# Patient Record
Sex: MALE | Race: WHITE | Employment: PART TIME | URBAN - METROPOLITAN AREA
[De-identification: names, ages, dates, MRNs, and addresses within clinical notes are randomized per-mention and may not be internally consistent; named-entity substitution may affect disease eponyms.]

---

## 2021-06-10 ENCOUNTER — VIRTUAL VISIT (OUTPATIENT)
Dept: PRIMARY CARE CLINIC | Age: 26
End: 2021-06-10
Payer: MEDICARE

## 2021-06-10 DIAGNOSIS — J45.998 ASTHMA, PERSISTENT NOT CONTROLLED: Primary | ICD-10-CM

## 2021-06-10 DIAGNOSIS — F90.2 ATTENTION DEFICIT HYPERACTIVITY DISORDER (ADHD), COMBINED TYPE: ICD-10-CM

## 2021-06-10 DIAGNOSIS — L40.9 PSORIASIS: ICD-10-CM

## 2021-06-10 DIAGNOSIS — F41.9 ANXIETY AND DEPRESSION: ICD-10-CM

## 2021-06-10 DIAGNOSIS — F32.A ANXIETY AND DEPRESSION: ICD-10-CM

## 2021-06-10 PROBLEM — F90.9 ADHD: Status: ACTIVE | Noted: 2021-06-10

## 2021-06-10 PROCEDURE — G8421 BMI NOT CALCULATED: HCPCS | Performed by: STUDENT IN AN ORGANIZED HEALTH CARE EDUCATION/TRAINING PROGRAM

## 2021-06-10 PROCEDURE — G8427 DOCREV CUR MEDS BY ELIG CLIN: HCPCS | Performed by: STUDENT IN AN ORGANIZED HEALTH CARE EDUCATION/TRAINING PROGRAM

## 2021-06-10 PROCEDURE — 1036F TOBACCO NON-USER: CPT | Performed by: STUDENT IN AN ORGANIZED HEALTH CARE EDUCATION/TRAINING PROGRAM

## 2021-06-10 PROCEDURE — 99204 OFFICE O/P NEW MOD 45 MIN: CPT | Performed by: STUDENT IN AN ORGANIZED HEALTH CARE EDUCATION/TRAINING PROGRAM

## 2021-06-10 RX ORDER — CALCIPOTRIENE AND BETAMETHASONE DIPROPIONATE 50; .5 UG/G; MG/G
AEROSOL, FOAM TOPICAL
Qty: 60 G | Refills: 1 | Status: SHIPPED | OUTPATIENT
Start: 2021-06-10

## 2021-06-10 RX ORDER — BUDESONIDE AND FORMOTEROL FUMARATE DIHYDRATE 160; 4.5 UG/1; UG/1
2 AEROSOL RESPIRATORY (INHALATION) 2 TIMES DAILY
Qty: 1 INHALER | Refills: 0 | Status: SHIPPED | OUTPATIENT
Start: 2021-06-10 | End: 2021-07-06

## 2021-06-10 SDOH — ECONOMIC STABILITY: FOOD INSECURITY: WITHIN THE PAST 12 MONTHS, YOU WORRIED THAT YOUR FOOD WOULD RUN OUT BEFORE YOU GOT MONEY TO BUY MORE.: NEVER TRUE

## 2021-06-10 SDOH — ECONOMIC STABILITY: FOOD INSECURITY: WITHIN THE PAST 12 MONTHS, THE FOOD YOU BOUGHT JUST DIDN'T LAST AND YOU DIDN'T HAVE MONEY TO GET MORE.: NEVER TRUE

## 2021-06-10 ASSESSMENT — PATIENT HEALTH QUESTIONNAIRE - PHQ9
1. LITTLE INTEREST OR PLEASURE IN DOING THINGS: 0
SUM OF ALL RESPONSES TO PHQ QUESTIONS 1-9: 0
SUM OF ALL RESPONSES TO PHQ9 QUESTIONS 1 & 2: 0
2. FEELING DOWN, DEPRESSED OR HOPELESS: 0
SUM OF ALL RESPONSES TO PHQ QUESTIONS 1-9: 0
SUM OF ALL RESPONSES TO PHQ QUESTIONS 1-9: 0

## 2021-06-10 ASSESSMENT — ENCOUNTER SYMPTOMS
DIARRHEA: 0
NAUSEA: 0
COUGH: 0
SORE THROAT: 0
SHORTNESS OF BREATH: 0
VOMITING: 0
CONSTIPATION: 0
RHINORRHEA: 0

## 2021-06-10 ASSESSMENT — SOCIAL DETERMINANTS OF HEALTH (SDOH): HOW HARD IS IT FOR YOU TO PAY FOR THE VERY BASICS LIKE FOOD, HOUSING, MEDICAL CARE, AND HEATING?: NOT HARD AT ALL

## 2021-06-10 NOTE — PATIENT INSTRUCTIONS
Here are some places you can call for therapy    o [facility] and [address] and [place] [facility]  ii. [address] [facility], [address] [place] [address], [place] [address]  iii. [email or web address]  v. [address] Accepts [place]  ii. 271 [facility], [address] [place] [address], Yakov Martinez  iii. [phone] ext. [phone] [facility]  i. Accepts [facility]  ii. [facility], [facility]  iii. [phone]    o Sharri Jones  i. Accepts multiple Medicaid plans  ii. [address], [address], [place], Luige [address]  iii. [phone]  iv. [email or web address]    o Lala Katzs  i. Accepts multiple Medicaid plans  ii. [address], 9[facility], [place], [address] [place] [address]  iii. [phone]    o Vinny Suresh Psy.D.  i. Accepts multiple Medicaid plans  ii. [facility], [place], Luige [address]  iii. 77 Rupam Rohan Maravilla Accepts multiple Medicaid plans  ii. 820 [facility], 6977 [facility], [address]  iii. 3890 [facility]  i. Most Medicaid plans  ii. [address], 1632 [facility], [facility]  iii.  [phone]

## 2021-06-10 NOTE — PROGRESS NOTES
Adrienne Hernandez (:  1995) is a 23 y.o. male,New patient, here for evaluation of the following chief complaint(s): Establish Care         ASSESSMENT/PLAN:    1. Asthma, persistent not controlled  -Does not seem to be well controlled, patient is stable but will need controlled medication  -     budesonide-formoterol (SYMBICORT) 160-4.5 MCG/ACT AERO; Inhale 2 puffs into the lungs 2 times daily, Disp-1 Inhaler, R-0Normal  2. Psoriasis  -Refilled his Enstilar today    3. Attention deficit hyperactivity disorder (ADHD), combined type  Unsure of his diagnosis of ADHD seems to have more anxiety and depression type symptoms that can cause deficits in attention. I believe if we treat his anxiety and depression that his attention will get better can reevaluate after anxiety and depression well controlled. 4. Anxiety and depression  We will start Zoloft 50 mg daily today and will follow up in 1 month  Sy Janel will establish care with a therapist in his network    Return in about 4 weeks (around 2021) for annual physical.       SUBJECTIVE/OBJECTIVE:  HPI    Asthma  - once or twice every 2 days will have to use over the counter inhaler  - sometimes will wake up at night SHORTNESS OF BREATH twice a week. Psoriasis  - used to be out of control but saw derm a year and half ago and started on Enstilar.   - well controlled now  - doesn't use Enstilar ex very often. Used to be on ADHD medications from previous PCP and it made him too quiet, and staring off in to space and not a sense of wellbeing  atoxemoxetine at 100 mg daily.   Had formal evaluation in 5th or 6th grade and was diagnosed, [place]  Tried 5 or 6 medications straterra and other meds hes not sure of and lost a lot of weight and was taken off.  -unable to motivate to do tasks  - no drive to do anything  - no sense of urgency getting easily distractted  - fidgets not able to sit still     Anxiety and depression  - dx in 2016   - see above, also eating more to fill time  - history of cutting when he was in highschool  - no SI, feels like not feeling of value  - sx's make it very difficult to interact with others and was in a high pace job. - does not have a therapist but did see one when he was a kid and also one initial visit during pandemic.   - symptoms mostly everyday  - every so often will have panic attacks, mid racing, fear of failure especially at night    Does not get a lot of exercise    Has [email or web address]    Review of Systems   Constitutional: Negative for chills and fever. HENT: Negative for congestion, rhinorrhea and sore throat. Eyes: Negative for visual disturbance. Respiratory: Negative for cough and shortness of breath. Cardiovascular: Negative for chest pain. Gastrointestinal: Negative for constipation, diarrhea, nausea and vomiting. Endocrine: Negative for polydipsia and polyuria. Genitourinary: Negative for dysuria. Musculoskeletal: Negative for arthralgias. Skin: Negative for rash. Allergic/Immunologic: Negative for environmental allergies. Neurological: Negative for headaches. Psychiatric/Behavioral: The patient is nervous/anxious. Patient-Reported Vitals 6/10/2021   Patient-Reported Weight 225lbs   Patient-Reported Height 6'   Patient-Reported Temperature 98.5        Physical Exam  Vitals reviewed. Constitutional:       General: He is not in acute distress. Appearance: Normal appearance. He is not ill-appearing. HENT:      Head: Normocephalic and atraumatic. Right Ear: External ear normal.      Left Ear: External ear normal.   Eyes:      General: No scleral icterus. Right eye: No discharge. Left eye: No discharge. Extraocular Movements: Extraocular movements intact. Conjunctiva/sclera: Conjunctivae normal.   Pulmonary:      Effort: Pulmonary effort is normal. No respiratory distress. Musculoskeletal:      Cervical back: Neck supple.    Skin: Coloration: Skin is not jaundiced. Findings: No lesion or rash. Neurological:      Mental Status: He is alert. Cranial Nerves: No cranial nerve deficit. Coordination: Coordination normal.   Psychiatric:         Mood and Affect: Mood normal.                   Katey Souza was evaluated through a synchronous (real-time) audio-video encounter. The patient (or guardian if applicable) is aware that this is a billable service. Verbal consent to proceed has been obtained within the past 12 months. The visit was conducted pursuant to the emergency declaration under the [address] [place] [address], [address] [place] [address] authority and the [employer] General Act. Patient identification was verified, and a caregiver was present when appropriate. The patient was located in a state where the provider was credentialed to provide care. An electronic signature was used to authenticate this note.     --Blanka Canseco MD

## 2021-06-17 ENCOUNTER — TELEPHONE (OUTPATIENT)
Dept: PRIMARY CARE CLINIC | Age: 26
End: 2021-06-17

## 2021-06-17 DIAGNOSIS — Z00.00 ENCOUNTER FOR ANNUAL PHYSICAL EXAM: Primary | ICD-10-CM

## 2021-06-17 NOTE — TELEPHONE ENCOUNTER
Pt was at the [facility] to do his labs and the labs were not in the system for lab at the hospital to view them.     Please put lab orders in system so that the pt can get his labs done at [facility]    Please call pt when this is completed. :)

## 2021-07-02 DIAGNOSIS — J45.998 ASTHMA, PERSISTENT NOT CONTROLLED: ICD-10-CM

## 2021-07-06 RX ORDER — BUDESONIDE AND FORMOTEROL FUMARATE DIHYDRATE 160; 4.5 UG/1; UG/1
AEROSOL RESPIRATORY (INHALATION)
Qty: 10.2 INHALER | Refills: 1 | Status: SHIPPED | OUTPATIENT
Start: 2021-07-06 | End: 2021-09-20

## 2021-07-12 ENCOUNTER — OFFICE VISIT (OUTPATIENT)
Dept: PRIMARY CARE CLINIC | Age: 26
End: 2021-07-12
Payer: MEDICARE

## 2021-07-12 VITALS
HEIGHT: 71 IN | BODY MASS INDEX: 31.27 KG/M2 | WEIGHT: 223.4 LBS | OXYGEN SATURATION: 99 % | TEMPERATURE: 98.2 F | SYSTOLIC BLOOD PRESSURE: 120 MMHG | HEART RATE: 90 BPM | DIASTOLIC BLOOD PRESSURE: 76 MMHG

## 2021-07-12 DIAGNOSIS — J45.20 MILD INTERMITTENT ASTHMA WITHOUT COMPLICATION: ICD-10-CM

## 2021-07-12 DIAGNOSIS — F41.9 ANXIETY AND DEPRESSION: ICD-10-CM

## 2021-07-12 DIAGNOSIS — F32.A ANXIETY AND DEPRESSION: ICD-10-CM

## 2021-07-12 DIAGNOSIS — S16.1XXA STRAIN OF NECK MUSCLE, INITIAL ENCOUNTER: Primary | ICD-10-CM

## 2021-07-12 DIAGNOSIS — M54.50 ACUTE LEFT-SIDED LOW BACK PAIN WITHOUT SCIATICA: ICD-10-CM

## 2021-07-12 PROCEDURE — 1036F TOBACCO NON-USER: CPT | Performed by: STUDENT IN AN ORGANIZED HEALTH CARE EDUCATION/TRAINING PROGRAM

## 2021-07-12 PROCEDURE — G8417 CALC BMI ABV UP PARAM F/U: HCPCS | Performed by: STUDENT IN AN ORGANIZED HEALTH CARE EDUCATION/TRAINING PROGRAM

## 2021-07-12 PROCEDURE — 99214 OFFICE O/P EST MOD 30 MIN: CPT | Performed by: STUDENT IN AN ORGANIZED HEALTH CARE EDUCATION/TRAINING PROGRAM

## 2021-07-12 PROCEDURE — G8427 DOCREV CUR MEDS BY ELIG CLIN: HCPCS | Performed by: STUDENT IN AN ORGANIZED HEALTH CARE EDUCATION/TRAINING PROGRAM

## 2021-07-12 NOTE — PROGRESS NOTES
Aldo Duong (:  1995) is a 23 y.o. male,Established patient, here for evaluation of the following chief complaint(s):  Establish Care, Asthma, Psoriasis, ADHD, Depression, and Anxiety         ASSESSMENT/PLAN:  1. Strain of neck muscle, initial encounter  2. Acute left-sided low back pain without sciatica  Recommend home stretching exercises, heating pad, NSAIDs  If no improvement then can send to physical therapy  3. Mild intermittent asthma without complication  Improved somewhat on Symbicort but he was not taking it as directed, recommend he take it twice a day every day. Can decide to come off after get through this season  4. Anxiety and depression  Slightly improved but still not optimal  We will increase Zoloft to 75 mg daily and follow-up in 1 month  -     sertraline (ZOLOFT) 50 MG tablet; Take 1.5 tablets by mouth daily, Disp-45 tablet, R-1Normal      Return in about 4 weeks (around 2021). Subjective   SUBJECTIVE/OBJECTIVE:  HPI    Asthma  - started symbicort 6/10 but not using everyday did not know he was best to take this every day. -Not having to use rescue inhaler  Has noticed improvement    PARAMJIT  - feels a little better on zoloft 50 mg daily  - feels more calm but still having anxiious thoughts  -Very subtle    Neck pain/back pain  - for a couple days  - some tingling in arm but disapeared only lasted a few seconds  - no injuries but working more on his feet more  - has seen chiropractor for a bit for low back pain  - no numbness or tingling in pelvic region no bowel or bladder dysfunction, no radiation down legs, no known trauma. Review of Systems   All other systems reviewed and are negative. Objective   Physical Exam  Vitals reviewed. Constitutional:       General: He is not in acute distress. Appearance: Normal appearance. He is not ill-appearing. HENT:      Head: Normocephalic and atraumatic.       Right Ear: External ear normal.      Left Ear: External ear normal.   Eyes:      General: No scleral icterus. Right eye: No discharge. Left eye: No discharge. Extraocular Movements: Extraocular movements intact. Conjunctiva/sclera: Conjunctivae normal.   Neck:      Comments: Range of motion normal  Tenderness of patient along left side posterior neck    Pulmonary:      Effort: Pulmonary effort is normal. No respiratory distress. Musculoskeletal:      Cervical back: Neck supple. Comments: Normal range of motion  Negative straight leg raise bilaterally  Tenderness palpation along left lumbar paraspinal region   Skin:     Coloration: Skin is not jaundiced. Findings: No lesion or rash. Neurological:      Mental Status: He is alert. Cranial Nerves: No cranial nerve deficit. Coordination: Coordination normal.   Psychiatric:         Mood and Affect: Mood normal.                  An electronic signature was used to authenticate this note.     --Nereida Booker MD

## 2021-07-12 NOTE — PATIENT INSTRUCTIONS
Patient Education      Patient Education        Neck: Exercises  Introduction  Here are some examples of exercises for you to try. The exercises may be suggested for a condition or for rehabilitation. Start each exercise slowly. Ease off the exercises if you start to have pain. You will be told when to start these exercises and which ones will work best for you. How to do the exercises  Neck stretch   1. This stretch works best if you keep your shoulder down as you lean away from it. To help you remember to do this, start by relaxing your shoulders and lightly holding on to your thighs or your chair. 2. Tilt your head toward your shoulder and hold for 15 to 30 seconds. Let the weight of your head stretch your muscles. 3. If you would like a little added stretch, use your hand to gently and steadily pull your head toward your shoulder. For example, keeping your right shoulder down, lean your head to the left. 4. Repeat 2 to 4 times toward each shoulder. Diagonal neck stretch   1. Turn your head slightly toward the direction you will be stretching, and tilt your head diagonally toward your chest and hold for 15 to 30 seconds. 2. If you would like a little added stretch, use your hand to gently and steadily pull your head forward on the diagonal.  3. Repeat 2 to 4 times toward each side. Dorsal glide stretch   The dorsal glide stretches the back of the neck. If you feel pain, do not glide so far back. Some people find this exercise easier to do while lying on their backs with an ice pack on the neck. 1. Sit or stand tall and look straight ahead. 2. Slowly tuck your chin as you glide your head backward over your body  3. Hold for a count of 6, and then relax for up to 10 seconds. 4. Repeat 8 to 12 times. Chest and shoulder stretch   1. Sit or stand tall and glide your head backward as in the dorsal glide stretch. 2. Raise both arms so that your hands are next to your ears.   3. Take a deep breath, and as you breathe out, lower your elbows down and behind your back. You will feel your shoulder blades slide down and together, and at the same time you will feel a stretch across your chest and the front of your shoulders. 4. Hold for about 6 seconds, and then relax for up to 10 seconds. 5. Repeat 8 to 12 times. Strengthening: Hands on head   1. Move your head backward, forward, and side to side against gentle pressure from your hands, holding each position for about 6 seconds. 2. Repeat 8 to 12 times. Follow-up care is a key part of your treatment and safety. Be sure to make and go to all appointments, and call your doctor if you are having problems. It's also a good idea to know your test results and keep a list of the medicines you take. Where can you learn more? Go to https://Spectrum Mobileruddyeb.RunMyProcess. org and sign in to your H2scan account. Enter P975 in the RIGID box to learn more about \"Neck: Exercises. \"     If you do not have an account, please click on the \"Sign Up Now\" link. Current as of: November 16, 2020               Content Version: 12.9  © 1264-4134 Healthwise, go2 media. Care instructions adapted under license by [facility]). If you have questions about a medical condition or this instruction, always ask your healthcare professional. [address] any warranty or liability for your use of this information. Low Back Pain: Exercises  Introduction  Here are some examples of exercises for you to try. The exercises may be suggested for a condition or for rehabilitation. Start each exercise slowly. Ease off the exercises if you start to have pain. You will be told when to start these exercises and which ones will work best for you. How to do the exercises  Press-up   1. Lie on your stomach, supporting your body with your forearms. 2. Press your elbows down into the floor to raise your upper back.  As you do this, relax your stomach muscles and allow your back to arch without using your back muscles. As your press up, do not let your hips or pelvis come off the floor. 3. Hold for 15 to 30 seconds, then relax. 4. Repeat 2 to 4 times. Alternate arm and leg (bird dog) exercise   Do this exercise slowly. Try to keep your body straight at all times, and do not let one hip drop lower than the other. 1. Start on the floor, on your hands and knees. 2. Tighten your belly muscles. 3. Raise one leg off the floor, and hold it straight out behind you. Be careful not to let your hip drop down, because that will twist your trunk. 4. Hold for about 6 seconds, then lower your leg and switch to the other leg. 5. Repeat 8 to 12 times on each leg. 6. Over time, work up to holding for 10 to 30 seconds each time. 7. If you feel stable and secure with your leg raised, try raising the opposite arm straight out in front of you at the same time. Knee-to-chest exercise   1. Lie on your back with your knees bent and your feet flat on the floor. 2. Bring one knee to your chest, keeping the other foot flat on the floor (or keeping the other leg straight, whichever feels better on your lower back). 3. Keep your lower back pressed to the floor. Hold for at least 15 to 30 seconds. 4. Relax, and lower the knee to the starting position. 5. Repeat with the other leg. Repeat 2 to 4 times with each leg. 6. To get more stretch, put your other leg flat on the floor while pulling your knee to your chest.    Curl-ups   1. Lie on the floor on your back with your knees bent at a 90-degree angle. Your feet should be flat on the floor, about 12 inches from your buttocks. 2. Cross your arms over your chest. If this bothers your neck, try putting your hands behind your neck (not your head), with your elbows spread apart. 3. Slowly tighten your belly muscles and raise your shoulder blades off the floor.   4. Keep your head in line with your body, and do not press your chin to your chest.  5. Hold this position for 1 or 2 seconds, then slowly lower yourself back down to the floor. 6. Repeat 8 to 12 times. Pelvic tilt exercise   1. Lie on your back with your knees bent. 2. \"Brace\" your stomach. This means to tighten your muscles by pulling in and imagining your belly button moving toward your spine. You should feel like your back is pressing to the floor and your hips and pelvis are rocking back. 3. Hold for about 6 seconds while you breathe smoothly. 4. Repeat 8 to 12 times. Heel dig bridging   1. Lie on your back with both knees bent and your ankles bent so that only your heels are digging into the floor. Your knees should be bent about 90 degrees. 2. Then push your heels into the floor, squeeze your buttocks, and lift your hips off the floor until your shoulders, hips, and knees are all in a straight line. 3. Hold for about 6 seconds as you continue to breathe normally, and then slowly lower your hips back down to the floor and rest for up to 10 seconds. 4. Do 8 to 12 repetitions. Hamstring stretch in doorway   1. Lie on your back in a doorway, with one leg through the open door. 2. Slide your leg up the wall to straighten your knee. You should feel a gentle stretch down the back of your leg. 3. Hold the stretch for at least 15 to 30 seconds. Do not arch your back, point your toes, or bend either knee. Keep one heel touching the floor and the other heel touching the wall. 4. Repeat with your other leg. 5. Do 2 to 4 times for each leg. Hip flexor stretch   1. Kneel on the floor with one knee bent and one leg behind you. Place your forward knee over your foot. Keep your other knee touching the floor. 2. Slowly push your hips forward until you feel a stretch in the upper thigh of your rear leg. 3. Hold the stretch for at least 15 to 30 seconds. Repeat with your other leg. 4. Do 2 to 4 times on each side. Wall sit   1.  Stand with your back 10 to 12 inches away from a wall. 2. Lean into the wall until your back is flat against it. 3. Slowly slide down until your knees are slightly bent, pressing your lower back into the wall. 4. Hold for about 6 seconds, then slide back up the wall. 5. Repeat 8 to 12 times. Follow-up care is a key part of your treatment and safety. Be sure to make and go to all appointments, and call your doctor if you are having problems. It's also a good idea to know your test results and keep a list of the medicines you take. Where can you learn more? Go to https://In Ovopepiceweb.maufait. org and sign in to your Gluster account. Enter F625 in the LapSpace box to learn more about \"Low Back Pain: Exercises. \"     If you do not have an account, please click on the \"Sign Up Now\" link. Current as of: November 16, 2020               Content Version: 12.9  © 2006-2021 Healthwise, Incorporated. Care instructions adapted under license by [facility] ([facility]). If you have questions about a medical condition or this instruction, always ask your healthcare professional. [place] [address] any warranty or liability for your use of this information.

## 2021-08-04 DIAGNOSIS — F41.9 ANXIETY AND DEPRESSION: ICD-10-CM

## 2021-08-04 DIAGNOSIS — F32.A ANXIETY AND DEPRESSION: ICD-10-CM

## 2021-08-04 NOTE — TELEPHONE ENCOUNTER
Medication:   Requested Prescriptions     Pending Prescriptions Disp Refills    sertraline (ZOLOFT) 50 MG tablet [Pharmacy Med Name: SERTRALINE HCL 50 MG TABLET] 30 tablet 1     Sig: TAKE 1 TABLET BY MOUTH EVERY DAY     Last Filled:  7.12.21    Last appt: 7/12/2021   Next appt: 8/12/2021    Last OARRS: No flowsheet data found.

## 2021-09-20 DIAGNOSIS — J45.998 ASTHMA, PERSISTENT NOT CONTROLLED: ICD-10-CM

## 2021-09-20 RX ORDER — BUDESONIDE AND FORMOTEROL FUMARATE DIHYDRATE 160; 4.5 UG/1; UG/1
AEROSOL RESPIRATORY (INHALATION)
Qty: 10.2 EACH | Refills: 1 | Status: SHIPPED | OUTPATIENT
Start: 2021-09-20 | End: 2021-11-23

## 2021-09-20 NOTE — TELEPHONE ENCOUNTER
Medication:   Requested Prescriptions     Pending Prescriptions Disp Refills    budesonide-formoterol (SYMBICORT) 160-4.5 MCG/ACT AERO [Pharmacy Med Name: BUDESONIDE-FORMOTEROL 160-4.5] 10.2 each 1     Sig: TAKE 2 PUFFS BY MOUTH TWICE A DAY     Last Filled:  7.6.21    Last appt: 7/12/2021   Next appt: Visit date not found    Last OARRS: No flowsheet data found.

## 2021-11-03 ENCOUNTER — OFFICE VISIT (OUTPATIENT)
Dept: PRIMARY CARE CLINIC | Age: 26
End: 2021-11-03
Payer: MEDICARE

## 2021-11-03 VITALS
DIASTOLIC BLOOD PRESSURE: 82 MMHG | BODY MASS INDEX: 31.66 KG/M2 | TEMPERATURE: 98.4 F | WEIGHT: 227 LBS | HEART RATE: 89 BPM | SYSTOLIC BLOOD PRESSURE: 116 MMHG | OXYGEN SATURATION: 96 %

## 2021-11-03 DIAGNOSIS — H61.22 LEFT EAR IMPACTED CERUMEN: ICD-10-CM

## 2021-11-03 DIAGNOSIS — F32.A ANXIETY AND DEPRESSION: Primary | ICD-10-CM

## 2021-11-03 DIAGNOSIS — G89.29 CHRONIC BILATERAL LOW BACK PAIN WITHOUT SCIATICA: ICD-10-CM

## 2021-11-03 DIAGNOSIS — G89.29 CHRONIC NECK PAIN: ICD-10-CM

## 2021-11-03 DIAGNOSIS — F41.9 ANXIETY AND DEPRESSION: Primary | ICD-10-CM

## 2021-11-03 DIAGNOSIS — M54.2 CHRONIC NECK PAIN: ICD-10-CM

## 2021-11-03 DIAGNOSIS — M54.50 CHRONIC BILATERAL LOW BACK PAIN WITHOUT SCIATICA: ICD-10-CM

## 2021-11-03 PROCEDURE — 99214 OFFICE O/P EST MOD 30 MIN: CPT | Performed by: STUDENT IN AN ORGANIZED HEALTH CARE EDUCATION/TRAINING PROGRAM

## 2021-11-03 PROCEDURE — 1036F TOBACCO NON-USER: CPT | Performed by: STUDENT IN AN ORGANIZED HEALTH CARE EDUCATION/TRAINING PROGRAM

## 2021-11-03 PROCEDURE — G8427 DOCREV CUR MEDS BY ELIG CLIN: HCPCS | Performed by: STUDENT IN AN ORGANIZED HEALTH CARE EDUCATION/TRAINING PROGRAM

## 2021-11-03 PROCEDURE — G8484 FLU IMMUNIZE NO ADMIN: HCPCS | Performed by: STUDENT IN AN ORGANIZED HEALTH CARE EDUCATION/TRAINING PROGRAM

## 2021-11-03 PROCEDURE — G8417 CALC BMI ABV UP PARAM F/U: HCPCS | Performed by: STUDENT IN AN ORGANIZED HEALTH CARE EDUCATION/TRAINING PROGRAM

## 2021-11-03 RX ORDER — HYDROXYZINE HYDROCHLORIDE 25 MG/1
25 TABLET, FILM COATED ORAL NIGHTLY
Qty: 30 TABLET | Refills: 0 | Status: SHIPPED | OUTPATIENT
Start: 2021-11-03 | End: 2021-12-03

## 2021-11-03 RX ORDER — VENLAFAXINE HYDROCHLORIDE 37.5 MG/1
37.5 CAPSULE, EXTENDED RELEASE ORAL DAILY
Qty: 30 CAPSULE | Refills: 1 | Status: SHIPPED | OUTPATIENT
Start: 2021-11-03 | End: 2022-01-31

## 2021-11-03 NOTE — PROGRESS NOTES
Aditi Lucero (:  1995) is a 23 y.o. male,Established patient, here for evaluation of the following chief complaint(s):  Mental Health Problem (taking over everyday, struggling )         ASSESSMENT/PLAN:  1. Anxiety and depression  Assessment & Plan:   Not well controlled  He will find a new therapist  We will start Effexor  Hydroxyzine as needed for anxiety and can use at nighttime for sleeping  Follow-up in 1 month  Did discuss ADHD medication but I feel like all of his attention issues probably stem from depression more so than strictly ADHD. Orders:  -     venlafaxine (EFFEXOR XR) 37.5 MG extended release capsule; Take 1 capsule by mouth daily, Disp-30 capsule, R-1Normal  -     hydrOXYzine (ATARAX) 25 MG tablet; Take 1 tablet by mouth nightly, Disp-30 tablet, R-0Normal  2. Chronic bilateral low back pain without sciatica  Assessment & Plan:   Chronic  Physical therapy  NSAIDs/stretching  Orders:  -     [facility] PT - [facility]  3. Chronic neck pain  Assessment & Plan:  Chronic  Same plan as above  Given cervical neck exercises he can do at home as well  Orders:  -     [facility] - [facility]  4. Left ear impacted cerumen  irrigated today and pt tolerated well    No follow-ups on file. Subjective   SUBJECTIVE/OBJECTIVE:  HPI    Depression with anxiety  Was on zoloft 50 mg then increased to 75 mg daily  Cannot get motivated, not sleeping more than 5 hours and interrupted, qdoesnt feel he can get comfortable in bed, cant feel a middle ground wand makes irrational judgements and will talk himself out of things like exercising or tasks at home. Racing thoughts at night, feeling o f failure and anxiety. Went to therapist and didn't feel comfortable with her and stopped going. Has tried benadryl and did work but felt like a zombie the next morning has tried melatonin and essential oils with some success.      Has ADHD dx as a child was on starterra etc ccant remember if it worked    Chroni back and neck pain has had this for years, no injury no trauma, will just feel sore and stiff, no red flag symptoms, has not tried physical therapy. Left ear pain once start getting cold has had history of cerumen impaction for    Review of Systems   All other systems reviewed and are negative. Objective   Physical Exam  Vitals reviewed. Constitutional:       General: He is not in acute distress. Appearance: Normal appearance. He is not ill-appearing, toxic-appearing or diaphoretic. HENT:      Head: Normocephalic and atraumatic. Right Ear: Tympanic membrane, ear canal and external ear normal.      Left Ear: Tympanic membrane, ear canal and external ear normal.      Ears:      Comments: Tear with cerumen impaction, after irrigation normal TM    visualized     Nose: Nose normal.      Mouth/Throat:      Mouth: Mucous membranes are moist.   Eyes:      Extraocular Movements: Extraocular movements intact. Neck:      Comments: TTP along left superior border of trapezius muscle  Some pain with forward flexion lower back but good mobility    Cardiovascular:      Rate and Rhythm: Normal rate and regular rhythm. Heart sounds: Normal heart sounds. No murmur heard. No friction rub. No gallop. Pulmonary:      Effort: Pulmonary effort is normal.      Breath sounds: Normal breath sounds. No wheezing, rhonchi or rales. Musculoskeletal:      Cervical back: Normal range of motion. Skin:     General: Skin is warm. Neurological:      General: No focal deficit present. Mental Status: He is alert. Psychiatric:         Mood and Affect: Mood normal.                  An electronic signature was used to authenticate this note.     --Jose Conner MD

## 2021-11-03 NOTE — ASSESSMENT & PLAN NOTE
Not well controlled  He will find a new therapist  We will start Effexor  Hydroxyzine as needed for anxiety and can use at nighttime for sleeping  Follow-up in 1 month  Did discuss ADHD medication but I feel like all of his attention issues probably stem from depression more so than strictly ADHD.

## 2021-11-04 ENCOUNTER — HOSPITAL ENCOUNTER (OUTPATIENT)
Dept: PHYSICAL THERAPY | Age: 26
Setting detail: THERAPIES SERIES
Discharge: HOME | End: 2021-11-04
Payer: MEDICARE

## 2021-11-04 PROCEDURE — 97161 PT EVAL LOW COMPLEX 20 MIN: CPT | Performed by: PHYSICAL THERAPIST

## 2021-11-04 PROCEDURE — 97110 THERAPEUTIC EXERCISES: CPT | Performed by: PHYSICAL THERAPIST

## 2021-11-04 PROCEDURE — 97140 MANUAL THERAPY 1/> REGIONS: CPT | Performed by: PHYSICAL THERAPIST

## 2021-11-04 NOTE — FLOWSHEET NOTE
Seated no $ 3\"x10 HEP   Belt S UT R/L 4x15\" R/L HEP   Self SOR w/ tennis ball  Tennis ball self massage scap/LB demo'd  demo'd HEP  HEP                                 Pt ed: eval findings, HEP, using HP/CP at home, proper posturing/postural awareness          Manual Intervention ([id number])     STM L>R UT, LS, MARIANA, SOR, gentle UT S R/L, lateral glides R->L gr ll-lll x10'                             NMR re-education (50321)  CUES NEEDED                                                    Therapeutic Exercise and NMR EXR  [x] (48860) Provided verbal/tactile cueing for activities related to strengthening, flexibility, endurance, ROM  for improvements in proximal hip and core control with self care, mobility, lifting and ambulation.  [] (31494) Provided verbal/tactile cueing for activities related to improving balance, coordination, kinesthetic sense, posture, motor skill, proprioception  to assist with core control in self care, mobility, lifting, and ambulation.      Therapeutic Activities:    [] (31199 or 31337) Provided verbal/tactile cueing for activities related to improving balance, coordination, kinesthetic sense, posture, motor skill, proprioception and motor activation to allow for proper function  with self care and ADLs  [] (53028) Provided training and instruction to the patient for proper core and proximal hip recruitment and positioning with ambulation re-education     Home Exercise Program:    [x] (47883) Reviewed/Progressed HEP activities related to strengthening, flexibility, endurance, ROM of core, proximal hip and LE for functional self-care, mobility, lifting and ambulation   [] (02400) Reviewed/Progressed HEP activities related to improving balance, coordination, kinesthetic sense, posture, motor skill, proprioception of core, proximal hip and LE for self care, mobility, lifting, and ambulation      Manual Treatments:  PROM / STM / Oscillations-Mobs:  G-I, II, III, IV (PA's, Inf., Post.)  [x] (93125) Provided manual therapy to mobilize proximal hip and LS spine soft tissue/joints for the purpose of modulating pain, promoting relaxation,  increasing ROM, reducing/eliminating soft tissue swelling/inflammation/restriction, improving soft tissue extensibility and allowing for proper ROM for normal function with self care, mobility, lifting and ambulation. Modalities:   declined    Charges  Timed Code Treatment Minutes: 25   Total Treatment Minutes: 47 (eval)       [x] EVAL (LOW) 79029   [] EVAL (MOD) 09334   [] EVAL (HIGH) 88095   [] RE-EVAL     [x] LJ(13988) x1     [] IONTO  [] NMR (72657) x     [] VASO  [x] Manual (43388) x 1     [] Other:  [] TA x      [] Mech Traction (18135)  [] ES(attended) (38021)      [] ES (un) (35648):     Goals:   Patient stated goal: Pt would like to get back to exercising without pain or restriction. [] Progressing: [] Met: [] Not Met: [] Adjusted    Therapist goals for Patient:   Short Term Goals: To be achieved in: 2 weeks  1. Independent in HEP and progression per patient tolerance, in order to prevent re-injury. [] Progressing: [] Met: [] Not Met: [] Adjusted  2. Patient will have a decrease in pain to facilitate improvement in movement, function, and ADLs as indicated by Functional Deficits. [] Progressing: [] Met: [] Not Met: [] Adjusted      Long Term Goals: To be achieved in: 8 weeks  1. Disability index score of 18% or less for the ModOwestry and 24% on the NDI  to assist with reaching prior level of function. [] Progressing: [] Met: [] Not Met: [] Adjusted  2. Patient will demonstrate increased AROM with cervical flexion to 60 deg and cervical rotation to 75 deg bilat without pain in order to be able to drive a car and perform work-related duties without pain or restriction. [] Progressing: [] Met: [] Not Met: [] Adjusted  3.  Patient will demonstrate an increase in Strength with shoulder flexion and abd to 5/5 without pain in order to be able to  and lift objects from the floor without pain or restriction. [] Progressing: [] Met: [] Not Met: [] Adjusted  4. Patient will be able to hold chin tuck for 30 sec without pain to increase cervical endurance and postural stability in order to be able to perform household chores without pain or restriction. [] Progressing: [] Met: [] Not Met: [] Adjusted   5. Patient will demo good core control and proper lifting form to lift 20# floor to waist in order to perform safe work duties without pain. [] Progressing: [] Met: [] Not Met: [] Adjusted    Progression Towards Functional goals:  [] Patient is progressing as expected towards functional goals listed. [] Progression is slowed due to complexities listed. [] Progression has been slowed due to co-morbidities. [x] Plan just implemented, too soon to assess goals progression  [] Other:     Overall Progression Towards Functional goals/ Treatment Progress Update:  [] Patient is progressing as expected towards functional goals listed. [] Progression is slowed due to complexities/Impairments listed. [] Progression has been slowed due to co-morbidities.   [x] Plan just implemented, too soon to assess goals progression <30days   [] Goals require adjustment due to lack of progress  [] Patient is not progressing as expected and requires additional follow up with physician  [] Other    Prognosis for POC: [x] Good [] Fair  [] Poor      Patient requires continued skilled intervention: [x] Yes  [] No    Treatment/Activity Tolerance:  [x] Patient able to complete treatment  [] Patient limited by fatigue  [] Patient limited by pain    [] Patient limited by other medical complications  [] Other:     ASSESSMENT: see eval    Return to Play: (if applicable)   []  Stage 1: Intro to Strength   []  Stage 2: Return to Run and Strength   []  Stage 3: Return to Jump and Strength   []  Stage 4: Dynamic Strength and Agility   []  Stage 5: Sport Specific Training     []  Ready to Return to Play, Meets All Above Stages   []  Not Ready for Return to Sports   Comments:                         PLAN: See eval  [] Continue per plan of care [] Shannon Clayton current plan (see comments above)  [x] Plan of care initiated [] Hold pending MD visit [] Discharge      Electronically signed by:  Fabricio Trejo, JEREMIAH Harrison  Therapist was present, directed the patient's care, made skilled judgement, and was responsible for assessment and treatment of the patient. Note: If patient does not return for scheduled/ recommended follow up visits, this note will serve as a discharge from care along with most recent update on progress. Access Code: [email or web address]  URL: [email or web address]  Date: 11/04/2021  Prepared by: Fabricio Trejo    Exercises  Supine Chin Tuck - 1-2 x daily - 7 x weekly - 1-2 sets - 8-10 reps - 2-3 seconds hold  Shoulder External Rotation and Scapular Retraction - 1-2 x daily - 7 x weekly - 2-3 sets - 5-10 reps - 2-3 seconds hold  1st Rib self-mobilization - 1-2 x daily - 7 x weekly - 3-5 sets - 10-20 seconds hold  Supine Suboccipital Release with Tennis Balls - 1-2 x daily - 7 x weekly - 1-3 sets - 1-5 minutes as needed hold  Self trigger point release with tennis ball - 1 x daily - 7 x weekly

## 2021-11-04 NOTE — PLAN OF CARE
The [facility] and [facility], [address], [address]  Phone: [phone]  Fax [phone]    Physical Therapy Certification    Dear Referring Practitioner: Jereimas Arauz MD,    We had the pleasure of evaluating the following patient for physical therapy services at [address] [place] [address]. A summary of our findings can be found in the initial assessment below. This includes our plan of care. If you have any questions or concerns regarding these findings, please do not hesitate to contact me at the office phone number checked above. Thank you for the referral.       Physician Signature:_______________________________Date:__________________  By signing above (or electronic signature), therapists plan is approved by physician    Patient: Jessica Frazier   : 1995   MRN: [id number]  Referring Physician: Referring Practitioner: Jeremias Arauz MD      Evaluation Date: 2021      Medical Diagnosis Information:  Diagnosis: M54.2, G89.29 (ICD-10-CM) - Chronic neck pain,M54.50, G89.29 (ICD-10-CM) - Chronic bilateral low back pain without sciatica   Treatment Diagnosis: M54.2, G89.29 (ICD-10-CM) - Chronic neck pain                                         Insurance information: PT Insurance Information: [place], no auth, no copay, 30 visits       Precautions/ Contra-indications: Asthma, Anxiety, Depression, ADHD    C-SSRS Triggered by Intake questionnaire (Past 2 wk assessment):   [x] No, Questionnaire did not trigger screening.    [x] Yes, Patient intake triggered further evaluation      [x] C-SSRS Screening completed--pt is currently under care with PCP regarding and also seeking out a new therapist to work with regarding, denies risk for self harm  [] PCP notified via Plan of Care  [] Emergency services notified     Latex Allergy:  [x]NO      []YES  Preferred Language for Healthcare:   [x]English []other:    SUBJECTIVE: Patient stated complaint:Pt reports long standing history of neck and lower back pain without known RICO. He has not had any PT or chiropractic care to date. He does try OTC meds and ice at home as needed. Reports HA's several days/week. He notes his symptoms tend to be worse in the morning and after prolonged standing and activity at work. Pt is usually on his feet for most of the day at work, with no modifications. Pt notes that that his pain is constant and not much provides relief. Pt would like to get back to exercising without pain or restriction.      Relevant Medical History: Asthma, Anxiety, Depression, ADHD  Functional Disability Index/G-Codes:   ModODI 38%, NDI 44% assessed on 11/04/21    Height 5'11  Weight 227 lbs  Pain Scale: 2-5/10  Easing factors: CP at home  Provocative factors: being on feet all day, bending forward     Type: [x]Constant   []Intermittent  []Radiating []Localized []other:     Numbness/Tingling: None    Occupation/School: [employer], on his feet all day on a hard floor    Living Status/Prior Level of Function: Independent with ADLs and IADLs, exercising/biking    OBJECTIVE:     ROM Left Right   Cervical Flex 36    Cervical Ext 45    Cervical Rotation 51, w/ L pain 62, w/ L pain   Cervical SB     LUMBAR FLEX 3\" above TC joint w/ B HS tightness and B LB soreness    LUMBAR EXT Min decreased w/ end range discomfort    Sidebend jt line w/ tightness 1\" above jt line with tightness    LEFT RIGHT   Shoulder Flex [facility] WFL   Shoulder ABD [facility] WFL        Strength  LEFT RIGHT   Shoulder Flex 5 5-, pain deltoid   Shoulder Abd 5- 5-   Elbow Flex     Elbow EXT     HIP Flexors 5 5   HIP Abductors     Knee EXT (quad) 5 5   Knee Flex (HS) 5 5   Ankle DF     Ankle PF        Reflexes/Sensation:    [x]Dermatomes/Myotomes intact    []UE Reflexes     [x]Normal R/L []Hypo      []Hyper   []LE Reflexes  NT    []Normal []Hypo      []Hyper   []Clonus/Hoffmans: NT   []Other:    Joint Function, education/learning barriers              []Environmental, home barriers              []profession/work barriers  []past PT/medical experience  []other:  Justification: Pt is going to be seen for anxiety/depression, but could possibly play a factor in motivation levels moving forward. Falls Risk Assessment (30 days):   [x] Falls Risk assessed and no intervention required. [] Falls Risk assessed and Patient requires intervention due to being higher risk   TUG score (>12s at risk):     [] Falls education provided, including       G-Codes:       ASSESSMENT:  Functional Impairments:     [x]Noted lumbar/proximal hip hypomobility   []Noted lumbosacral and/or generalized hypermobility   [x]Decreased Lumbosacral/hip/LE functional ROM   [x]Decreased core/proximal hip strength and neuromuscular control    [x]Decreased LE functional strength    []Abnormal reflexes/sensation/myotomal/dermatomal deficits  [x]Reduced balance/proprioceptive control    []other:      Functional Activity Limitations (from functional questionnaire and intake)   [x]Reduced ability to tolerate prolonged functional positions   [x]Reduced ability or difficulty with changes of positions or transfers between positions   [x]Reduced ability to maintain good posture and demonstrate good body mechanics with sitting, bending, and lifting   [x]Reduced ability to sleep   [x] Reduced ability or tolerance with driving and/or computer work   [x]Reduced ability to perform lifting, reaching, carrying tasks   [x]Reduced ability to squat   [x]Reduced ability to forward bend   [x]Reduced ability to ambulate prolonged functional periods/distances/surfaces   [x]Reduced ability to ascend/descend stairs   []other:       Participation Restrictions   [x]Reduced participation in self care activities   [x]Reduced participation in home management activities   [x]Reduced participation in work activities   [x]Reduced participation in social activities.    [x]Reduced participation in Subtext. Classification:   []Signs/symptoms consistent with Lumbar instability/stabilization subgroup. []Signs/symptoms consistent with Lumbar mobilization/manipulation subgroup, myotomes and dermatomes intact. Meets manipulation criteria. []Signs/symptoms consistent with Lumbar direction specific/centralization subgroup   []Signs/symptoms consistent with Lumbar traction subgroup     []Signs/symptoms consistent with lumbar facet dysfunction   []Signs/symptoms consistent with lumbar stenosis type dysfunction   []Signs/symptoms consistent with nerve root involvement including myotome & dermatome dysfunction   []Signs/symptoms consistent with post-surgical status including: decreased ROM, strength and function.    []signs/symptoms consistent with pathology which may benefit from Dry needling     [x]other: s/s consistent with postural dysfunction and poor muscular control and core stability  Prognosis/Rehab Potential:      []Excellent   [x]Good    []Fair   []Poor    Tolerance of evaluation/treatment:    []Excellent   [x]Good    []Fair   []Poor  Physical Therapy Evaluation Complexity Justification  [x] A history of present problem with:  [] no personal factors and/or comorbidities that impact the plan of care;  [x]1-2 personal factors and/or comorbidities that impact the plan of care  []3 personal factors and/or comorbidities that impact the plan of care  [x] An examination of body systems using standardized tests and measures addressing any of the following: body structures and functions (impairments), activity limitations, and/or participation restrictions;:  [x] a total of 1-2 or more elements   [] a total of 3 or more elements   [] a total of 4 or more elements   [x] A clinical presentation with:  [x] stable and/or uncomplicated characteristics   [] evolving clinical presentation with changing characteristics  [] unstable and unpredictable characteristics;   [x] Clinical decision making of [x] low, [] moderate, [] high complexity using standardized patient assessment instrument and/or measurable assessment of functional outcome. [x] EVAL (LOW) 53077 (typically 20 minutes face-to-face)  [] EVAL (MOD) 14798 (typically 30 minutes face-to-face)  [] EVAL (HIGH) 46743 (typically 45 minutes face-to-face)  [] RE-EVAL         PLAN: Begin PT focusing on: proximal hip mobilizations, LB mobs, LB core activation, proximal hip activation, cervical mobs, postural stability and HEP    Frequency/Duration:   1-2 days per week for 6-8 Weeks:  Interventions:  [x]  Therapeutic exercise including: strength training, ROM, for LE, Glutes and core   [x]  NMR activation and proprioception for glutes , LE and Core   [x]  Manual therapy as indicated for Hip complex, LE and spine to include: Dry Needling/IASTM, STM, PROM, Gr I-IV mobilizations, manipulation. [x]  Modalities as needed that may include: thermal agents, E-stim, Biofeedback, US, iontophoresis as indicated  [x]  Patient education on joint protection, postural re-education, activity modification, progression of HEP. HEP instruction: see bottom of form    GOALS:  Patient stated goal: Pt would like to get back to exercising without pain or restriction. [] Progressing: [] Met: [] Not Met: [] Adjusted    Therapist goals for Patient:   Short Term Goals: To be achieved in: 2 weeks  1. Independent in HEP and progression per patient tolerance, in order to prevent re-injury. [] Progressing: [] Met: [] Not Met: [] Adjusted  2. Patient will have a decrease in pain to facilitate improvement in movement, function, and ADLs as indicated by Functional Deficits. [] Progressing: [] Met: [] Not Met: [] Adjusted      Long Term Goals: To be achieved in: 8 weeks  1. Disability index score of 18% or less for the ModOwestry and 24% on the NDI  to assist with reaching prior level of function. [] Progressing: [] Met: [] Not Met: [] Adjusted  2.  Patient will

## 2021-11-09 ENCOUNTER — HOSPITAL ENCOUNTER (OUTPATIENT)
Dept: PHYSICAL THERAPY | Age: 26
Setting detail: THERAPIES SERIES
Discharge: HOME | End: 2021-11-09
Payer: MEDICARE

## 2021-11-09 PROCEDURE — 97140 MANUAL THERAPY 1/> REGIONS: CPT | Performed by: PHYSICAL THERAPIST

## 2021-11-09 PROCEDURE — 97110 THERAPEUTIC EXERCISES: CPT | Performed by: PHYSICAL THERAPIST

## 2021-11-09 NOTE — FLOWSHEET NOTE
[facility] [address], [address]  Phone: [phone]  Fax [phone]    Physical Therapy Treatment Note/ Progress Report:           Date:  2021    Patient Name:  Mariah Alas    :  1995  MRN: [id number]  Restrictions/Precautions:    Medical/Treatment Diagnosis Information:  · Diagnosis: M54.2, G89.29 (ICD-10-CM) - Chronic neck pain,M54.50, G89.29 (ICD-10-CM) - Chronic bilateral low back pain without sciatica  · Treatment Diagnosis: M54.2, G89.29 (ICD-10-CM) - Chronic neck pain  Insurance/Certification information:  PT Insurance Information: [place], no auth, no copay, 30 visits  Physician Information:  Referring Practitioner: Layla Dumont MD  Has the plan of care been signed (Y/N):        []  Yes  [x]  No     Date of Patient follow up with Physician: No future appts scheduled. Is this a Progress Report:     []  Yes  [x]  No        If Yes:  Date Range for reporting period:  Beginning 21  Ending    Progress report will be due (10 Rx or 30 days whichever is less):        Recertification will be due (POC Duration  / 90 days whichever is less): 21        Visit # Insurance Allowable Auth Required   In-person  30 visits []  Yes [x]  No    Telehealth   []  Yes []  No    Total            Functional Scale: NDI 44% disability; Mod Oswestry 38% disability    Date assessed:  21      Therapy Diagnosis/Practice Pattern: B, conservative      Number of Comorbidities:  []0     []1-2    [x]3+    Latex Allergy:  [x]NO      []YES  Preferred Language for Healthcare:   [x]English       []other:      Pain level:  eval 2-5/10    SUBJECTIVE:  Pt notes that he feels pretty good the past couple days and that he has had a decrease in pain since the initial visit. Pt reports that the stretches and exercises in HEP have been going well at home and he has been performing them consistently.  Pt notes most of the pain is coming from his neck, specifically on the L side near UT and levator. OBJECTIVE: See eval   Observation:    Test measurements:      RESTRICTIONS/PRECAUTIONS: Anxiety, Depression, ADHD, Asthma    Exercises/Interventions:     Therapeutic Ex (16844) Sets/sec/Reps Notes/CUES   Supine chin tuck 3\"x10 x 2 HEP, cues to avoid neck flexion   Seated no $  Seated HAB 3\"x10 x 2, RTB  RTB2 x 10 HEP   Supine Serratus Punches YTB 2'' x 2 x 10         Belt S UT R/L  HEP   Self SOR w/ tennis ball  Tennis ball self massage scap/LB  HEP  HEP        Prone T's thumbs down  Prone T's thumbs up 2 x 10  2 x 10         Half Foam Roll Stretching Routine:  Lat S  Pec S- Hands behind head  Pec S- Arms stretched out, arms down side   30'' x 2, R/L  30'' x 2  30'' x 2    TB Rows, Ext RTB, 2 x 10 ea Cues to maintain proper posture         Wall [place] 10x Cues to press LB into wall   YTB wall walks 10x Cues to retract scap        Pt ed: , using HP/CP at home, proper posturing/postural awareness 4'         Manual Intervention (97186) 14'    STM L>R UT, LS, MARIANA, SOR, gentle UT S R/L, lateral glides R->L gr ll-lll 6'    PA mobs to T1-5  STM to bilat rhomboids, mid trap, UT, posterior shoulder in prone 3'  5'                        NMR re-education (03200)  CUES NEEDED                                                    Therapeutic Exercise and NMR EXR  [x] (69255) Provided verbal/tactile cueing for activities related to strengthening, flexibility, endurance, ROM  for improvements in proximal hip and core control with self care, mobility, lifting and ambulation.  [] (00844) Provided verbal/tactile cueing for activities related to improving balance, coordination, kinesthetic sense, posture, motor skill, proprioception  to assist with core control in self care, mobility, lifting, and ambulation.      Therapeutic Activities:    [] (45455 or 11737) Provided verbal/tactile cueing for activities related to improving balance, coordination, kinesthetic sense, posture, motor skill, proprioception and motor activation to allow for proper function  with self care and ADLs  [] (01976) Provided training and instruction to the patient for proper core and proximal hip recruitment and positioning with ambulation re-education     Home Exercise Program:    [x] (54732) Reviewed/Progressed HEP activities related to strengthening, flexibility, endurance, ROM of core, proximal hip and LE for functional self-care, mobility, lifting and ambulation   [] (94940) Reviewed/Progressed HEP activities related to improving balance, coordination, kinesthetic sense, posture, motor skill, proprioception of core, proximal hip and LE for self care, mobility, lifting, and ambulation      Manual Treatments:  PROM / STM / Oscillations-Mobs:  G-I, II, III, IV (PA's, Inf., Post.)  [x] (32672) Provided manual therapy to mobilize proximal hip and LS spine soft tissue/joints for the purpose of modulating pain, promoting relaxation,  increasing ROM, reducing/eliminating soft tissue swelling/inflammation/restriction, improving soft tissue extensibility and allowing for proper ROM for normal function with self care, mobility, lifting and ambulation. Modalities: HP x 10 min to cervical and lumbar regions, supine    Charges  Timed Code Treatment Minutes: 45   Total Treatment Minutes: 55, HP       [] EVAL (LOW) 64403   [] EVAL (MOD) 39955   [] EVAL (HIGH) 32889   [] RE-EVAL     [x] Greenlandic(89235) x2     [] IONTO  [] NMR (25467) x     [] VASO  [x] Manual (23936) x 1    [] Other:  [] TA x      [] Mech Traction (69650)  [] ES(attended) (70179)     [] ES (un) (07869):     Goals:   Patient stated goal: Pt would like to get back to exercising without pain or restriction. [] Progressing: [] Met: [] Not Met: [] Adjusted    Therapist goals for Patient:   Short Term Goals: To be achieved in: 2 weeks  1.  Independent in HEP and progression per patient tolerance, in order to prevent re-injury. [] Progressing: [] Met: [] Not Met: [] Adjusted  2. Patient will have a decrease in pain to facilitate improvement in movement, function, and ADLs as indicated by Functional Deficits. [] Progressing: [] Met: [] Not Met: [] Adjusted      Long Term Goals: To be achieved in: 8 weeks  1. Disability index score of 18% or less for the ModOwestry and 24% on the NDI  to assist with reaching prior level of function. [] Progressing: [] Met: [] Not Met: [] Adjusted  2. Patient will demonstrate increased AROM with cervical flexion to 60 deg and cervical rotation to 75 deg bilat without pain in order to be able to drive a car and perform work-related duties without pain or restriction. [] Progressing: [] Met: [] Not Met: [] Adjusted  3. Patient will demonstrate an increase in Strength with shoulder flexion and abd to 5/5 without pain in order to be able to  and lift objects from the floor without pain or restriction. [] Progressing: [] Met: [] Not Met: [] Adjusted  4. Patient will be able to hold chin tuck for 30 sec without pain to increase cervical endurance and postural stability in order to be able to perform household chores without pain or restriction. [] Progressing: [] Met: [] Not Met: [] Adjusted   5. Patient will demo good core control and proper lifting form to lift 20# floor to waist in order to perform safe work duties without pain. [] Progressing: [] Met: [] Not Met: [] Adjusted    Progression Towards Functional goals:  [] Patient is progressing as expected towards functional goals listed. [] Progression is slowed due to complexities listed. [] Progression has been slowed due to co-morbidities. [x] Plan just implemented, too soon to assess goals progression  [] Other:     Overall Progression Towards Functional goals/ Treatment Progress Update:  [] Patient is progressing as expected towards functional goals listed.     [] Progression is slowed due to complexities/Impairments listed. [] Progression has been slowed due to co-morbidities. [x] Plan just implemented, too soon to assess goals progression <30days   [] Goals require adjustment due to lack of progress  [] Patient is not progressing as expected and requires additional follow up with physician  [] Other    Prognosis for POC: [x] Good [] Fair  [] Poor      Patient requires continued skilled intervention: [x] Yes  [] No    Treatment/Activity Tolerance:  [x] Patient able to complete treatment  [] Patient limited by fatigue  [] Patient limited by pain    [] Patient limited by other medical complications  [] Other:     ASSESSMENT: Pt was able to progress his exercises today by performing more in prone and standing. Pt showed improvements today through no complaints of pain or discomfort throughout all of the progressions and was not as tender to palpation as he was during the initial visit. Pt reported that the wall angels and prone T's, both thumb up and down, were tougher exercises and he felt weak/sore after completing those. Pt requested HP at the end of therapy today. Return to Play: (if applicable)   []  Stage 1: Intro to Strength   []  Stage 2: Return to Run and Strength   []  Stage 3: Return to Jump and Strength   []  Stage 4: Dynamic Strength and Agility   []  Stage 5: Sport Specific Training     []  Ready to Return to Play, Meets All Above Stages   []  Not Ready for Return to Sports   Comments:                         PLAN: See eval  [x] Continue per plan of care [] Alter current plan (see comments above)  [] Plan of care initiated [] Hold pending MD visit [] Discharge      Electronically signed by:  Mary Ann Singh PT   Silver Vega, [facility]  Therapist was present, directed the patient's care, made skilled judgement, and was responsible for assessment and treatment of the patient.         Note: If patient does not return for scheduled/ recommended follow up visits, this note will serve as a discharge from care along with most recent update on progress. Access Code: [email or web address]  URL: [email or web address]  Date: 11/04/2021  Prepared by: Mary Anne Gould    Exercises  Supine Chin Tuck - 1-2 x daily - 7 x weekly - 1-2 sets - 8-10 reps - 2-3 seconds hold  Shoulder External Rotation and Scapular Retraction - 1-2 x daily - 7 x weekly - 2-3 sets - 5-10 reps - 2-3 seconds hold  1st Rib self-mobilization - 1-2 x daily - 7 x weekly - 3-5 sets - 10-20 seconds hold  Supine Suboccipital Release with Tennis Balls - 1-2 x daily - 7 x weekly - 1-3 sets - 1-5 minutes as needed hold  Self trigger point release with tennis ball - 1 x daily - 7 x weekly

## 2021-11-16 ENCOUNTER — HOSPITAL ENCOUNTER (OUTPATIENT)
Dept: PHYSICAL THERAPY | Age: 26
Setting detail: THERAPIES SERIES
Discharge: HOME | End: 2021-11-16
Payer: MEDICARE

## 2021-11-16 NOTE — FLOWSHEET NOTE
The [address], [address]  Phone: [phone]  Fax [phone]    Physical Therapy  Cancellation/No-show Note  Patient Name:  Pamela Shipley  :  1995   Date:  2021  Cancelled visits to date: 0  No-shows to date: 1    Patient status for today's appointment patient:  []  Cancelled  []  Rescheduled appointment  [x]  No-show     Reason given by patient:  []  Patient ill  []  Conflicting appointment  []  No transportation    []  Conflict with work  [x]  No reason given  []  Other:     Comments:      Phone call information:   [x]  Phone call made today to patient at 10:45 at number provided:      []  Patient answered, conversation as follows:    [x]  Patient did not answer, message left as follows: Informed of missed appt and offered to reschedule to later this date if available. []  Phone call not made today    Electronically signed by:  Jason Sotelo, PT   Pedro Preston, [facility]  Therapist was present, directed the patient's care, made skilled judgement, and was responsible for assessment and treatment of the patient.

## 2021-11-18 ENCOUNTER — HOSPITAL ENCOUNTER (OUTPATIENT)
Dept: PHYSICAL THERAPY | Age: 26
Setting detail: THERAPIES SERIES
Discharge: HOME | End: 2021-11-18
Payer: MEDICARE

## 2021-11-18 NOTE — FLOWSHEET NOTE
The [facility], [address], [address]  Phone: [phone]  Fax [phone]    Physical Therapy  Cancellation/No-show Note  Patient Name:  Curly Arroyo  :  1995   Date:  2021  Cancelled visits to date: 0  No-shows to date: 2    Patient status for today's appointment patient:  []  Cancelled  []  Rescheduled appointment  [x]  No-show     Reason given by patient:  []  Patient ill  []  Conflicting appointment  []  No transportation    []  Conflict with work  [x]  No reason given  []  Other:     Comments:      Phone call information:   [x]  Phone call made today to patient at 10:45 at number provided:      []  Patient answered, conversation as follows:    [x]  Patient did not answer, message left as follows:  Patient informed of 2nd missed appt in a row and that he needs to call in the next 24 hours to confirm his next appt on 21 or all further appts will be cancelled and he will be discharged from PT due to non-compliance. []  Phone call not made today    Electronically signed by:  Seng Jackson, PT   Verita Aase, SPT  Therapist was present, directed the patient's care, made skilled judgement, and was responsible for assessment and treatment of the patient.

## 2021-11-21 DIAGNOSIS — J45.998 ASTHMA, PERSISTENT NOT CONTROLLED: ICD-10-CM

## 2021-11-22 NOTE — TELEPHONE ENCOUNTER
Medication:   Requested Prescriptions     Pending Prescriptions Disp Refills    SYMBICORT 160-4.5 MCG/ACT AERO [Pharmacy Med Name: Julien Nj 160-4.5 MCG INHALER] 10.2 each 1     Sig: TAKE 2 PUFFS BY MOUTH TWICE A DAY     Last Filled:  09/20/21    Last appt: 11/3/2021   Next appt: Visit date not found    Last OARRS: No flowsheet data found.

## 2021-11-23 ENCOUNTER — APPOINTMENT (OUTPATIENT)
Dept: PHYSICAL THERAPY | Age: 26
End: 2021-11-23
Payer: MEDICARE

## 2021-11-23 RX ORDER — BUDESONIDE AND FORMOTEROL FUMARATE DIHYDRATE 160; 4.5 UG/1; UG/1
AEROSOL RESPIRATORY (INHALATION)
Qty: 10.2 EACH | Refills: 1 | Status: SHIPPED | OUTPATIENT
Start: 2021-11-23 | End: 2022-03-02 | Stop reason: SDUPTHER

## 2021-11-30 ENCOUNTER — APPOINTMENT (OUTPATIENT)
Dept: PHYSICAL THERAPY | Age: 26
End: 2021-11-30
Payer: MEDICARE

## 2022-01-05 RX ORDER — ALBUTEROL SULFATE 90 UG/1
2 AEROSOL, METERED RESPIRATORY (INHALATION) EVERY 6 HOURS PRN
Qty: 18 G | Refills: 0 | Status: SHIPPED | OUTPATIENT
Start: 2022-01-05 | End: 2022-01-24

## 2022-01-05 RX ORDER — ALBUTEROL SULFATE 90 UG/1
2 AEROSOL, METERED RESPIRATORY (INHALATION) EVERY 6 HOURS PRN
COMMUNITY
End: 2022-01-05 | Stop reason: SDUPTHER

## 2022-01-05 NOTE — TELEPHONE ENCOUNTER
Medication:   Requested Prescriptions     Pending Prescriptions Disp Refills    albuterol sulfate  (90 Base) MCG/ACT inhaler 18 g      Sig: Inhale 2 puffs into the lungs every 6 hours as needed     Last Filled:  No date listed     Last appt: 11/3/2021   Next appt: Visit date not found    Last OARRS: No flowsheet data found.

## 2022-01-05 NOTE — TELEPHONE ENCOUNTER
Pt is asking for a refill of his Proair Respiclick.     [facility]   [address]  [address]

## 2022-01-24 RX ORDER — ALBUTEROL SULFATE 90 UG/1
AEROSOL, METERED RESPIRATORY (INHALATION)
Qty: 18 EACH | Refills: 5 | Status: SHIPPED | OUTPATIENT
Start: 2022-01-24

## 2022-01-24 NOTE — TELEPHONE ENCOUNTER
Medication:   Requested Prescriptions     Pending Prescriptions Disp Refills    albuterol sulfate  (90 Base) MCG/ACT inhaler [Pharmacy Med Name: ALBUTEROL HFA (VENTOLIN) INH] 18 each      Sig: TAKE 2 PUFFS BY MOUTH EVERY 6 HOURS AS NEEDED FOR WHEEZE     Last Filled:  1.5.22    Last appt: 11/3/2021   Next appt: Visit date not found    Last OARRS: No flowsheet data found.

## 2022-01-28 DIAGNOSIS — F41.9 ANXIETY AND DEPRESSION: ICD-10-CM

## 2022-01-28 DIAGNOSIS — F32.A ANXIETY AND DEPRESSION: ICD-10-CM

## 2022-01-28 NOTE — TELEPHONE ENCOUNTER
Medication:   Requested Prescriptions     Pending Prescriptions Disp Refills    venlafaxine (EFFEXOR XR) 37.5 MG extended release capsule [Pharmacy Med Name: VENLAFAXINE HCL ER 37.5 MG CAP] 30 capsule 1     Sig: TAKE 1 CAPSULE BY MOUTH EVERY DAY     Last Filled:  11.3.21    Last appt: 11/3/2021   Next appt: Visit date not found    Last OARRS: No flowsheet data found.

## 2022-01-31 RX ORDER — VENLAFAXINE HYDROCHLORIDE 37.5 MG/1
CAPSULE, EXTENDED RELEASE ORAL
Qty: 90 CAPSULE | Refills: 1 | Status: SHIPPED | OUTPATIENT
Start: 2022-01-31 | End: 2022-03-02

## 2022-03-02 ENCOUNTER — TELEMEDICINE (OUTPATIENT)
Dept: PRIMARY CARE CLINIC | Age: 27
End: 2022-03-02
Payer: MEDICARE

## 2022-03-02 DIAGNOSIS — R06.83 SNORING: ICD-10-CM

## 2022-03-02 DIAGNOSIS — F51.01 PRIMARY INSOMNIA: ICD-10-CM

## 2022-03-02 DIAGNOSIS — F41.9 ANXIETY AND DEPRESSION: Primary | ICD-10-CM

## 2022-03-02 DIAGNOSIS — F32.A ANXIETY AND DEPRESSION: Primary | ICD-10-CM

## 2022-03-02 DIAGNOSIS — J45.998 ASTHMA, PERSISTENT NOT CONTROLLED: ICD-10-CM

## 2022-03-02 PROCEDURE — 99214 OFFICE O/P EST MOD 30 MIN: CPT | Performed by: STUDENT IN AN ORGANIZED HEALTH CARE EDUCATION/TRAINING PROGRAM

## 2022-03-02 PROCEDURE — G8427 DOCREV CUR MEDS BY ELIG CLIN: HCPCS | Performed by: STUDENT IN AN ORGANIZED HEALTH CARE EDUCATION/TRAINING PROGRAM

## 2022-03-02 RX ORDER — MONTELUKAST SODIUM 10 MG/1
10 TABLET ORAL DAILY
Qty: 30 TABLET | Refills: 3 | Status: SHIPPED | OUTPATIENT
Start: 2022-03-02

## 2022-03-02 RX ORDER — BUDESONIDE AND FORMOTEROL FUMARATE DIHYDRATE 160; 4.5 UG/1; UG/1
2 AEROSOL RESPIRATORY (INHALATION) 2 TIMES DAILY
Qty: 10.2 EACH | Refills: 1 | Status: SHIPPED | OUTPATIENT
Start: 2022-03-02

## 2022-03-02 RX ORDER — VENLAFAXINE HYDROCHLORIDE 75 MG/1
75 CAPSULE, EXTENDED RELEASE ORAL DAILY
Qty: 30 CAPSULE | Refills: 1 | Status: SHIPPED | OUTPATIENT
Start: 2022-03-02 | End: 2022-05-02

## 2022-03-02 NOTE — ASSESSMENT & PLAN NOTE
Chronic not well controlled  I think that his insomnia is multifactorial, likely sleep apnea playing a major role, as well as anxiety and depression, would not recommend starting any medication for sleep such as trazodone or Ambien due to their heavy sedated effects and possible exacerbation of sleep apnea and therefore worsening of his asthma.   Would like him to see sleep medicine first.

## 2022-03-02 NOTE — PATIENT INSTRUCTIONS
1. Anxiety and depression  Assessment & Plan:   Chronic not well controlled  We will increase Effexor to 75 mg daily and follow-up in 1 month  Again, if it is sleep apnea this could worsen anxiety/depression  Orders:  -     venlafaxine (EFFEXOR XR) 75 MG extended release capsule; Take 1 capsule by mouth daily, Disp-30 capsule, R-1Normal  2. Asthma, persistent not controlled  Assessment & Plan:   Chronic not well controlled  You are using Symbicort as a rescue inhaler which is fine but would not recommend going over more than 12 puffs in a day. Take it atlease 2 puffs twice daily  Will add on Singulair daily  Orders:  -     budesonide-formoterol (SYMBICORT) 160-4.5 MCG/ACT AERO; Inhale 2 puffs into the lungs 2 times daily, Disp-10.2 each, R-1Normal  -     montelukast (SINGULAIR) 10 MG tablet; Take 1 tablet by mouth daily, Disp-30 tablet, R-3Normal  3. Snoring  Assessment & Plan:   Chronic  Very high likelihood that you have sleep apnea and this would explain a lot of your symptoms such as the snoring, fatigue, shortness of breath/exacerbation of asthma, increasing depressive symptoms and lack of energy. Recommend evaluation by sleep medicine    Orders:  -     Nohemy Ho MD, Sleep Medicine, [facility]  4. Primary insomnia  Assessment & Plan:   Chronic not well controlled  I think that his insomnia is multifactorial, likely sleep apnea playing a major role, as well as anxiety and depression, would not recommend starting any medication for sleep such as trazodone or Ambien due to their heavy sedated effects and possible exacerbation of sleep apnea and therefore worsening of his asthma.   Would like you to see sleep medicine first  Please call to set up appointment    Eunice Dupont MD   [place] [facility]   [place], [address] [facility]   Phone: [phone]

## 2022-03-02 NOTE — ASSESSMENT & PLAN NOTE
Chronic not well controlled  We will increase Effexor to 75 mg daily and follow-up in 1 month  Again, if it is a sleep apnea this could worsen his anxiety/depression

## 2022-03-02 NOTE — ASSESSMENT & PLAN NOTE
Chronic  Very high likelihood that patient has sleep apnea and this would explain a lot of his symptoms such as the snoring, fatigue, shortness of breath/exacerbation of asthma, increasing depressive symptoms and lack of energy.   Recommend evaluation by sleep medicine

## 2022-03-02 NOTE — PROGRESS NOTES
Franca Huerta (:  1995) is a Established patient, here for evaluation of the following:    Assessment & Plan   Below is the assessment and plan developed based on review of pertinent history, physical exam, labs, studies, and medications. 1. Anxiety and depression  Assessment & Plan:   Chronic not well controlled  We will increase Effexor to 75 mg daily and follow-up in 1 month  Again, if it is a sleep apnea this could worsen his anxiety/depression  Orders:  -     venlafaxine (EFFEXOR XR) 75 MG extended release capsule; Take 1 capsule by mouth daily, Disp-30 capsule, R-1Normal  2. Asthma, persistent not controlled  Assessment & Plan:   Chronic not well controlled  He is taking his Symbicort and using as a rescue inhaler which is fine but would not recommend going over more than 12 puffs in a day  Continue with Symbicort, will add on Singulair daily  Orders:  -     budesonide-formoterol (SYMBICORT) 160-4.5 MCG/ACT AERO; Inhale 2 puffs into the lungs 2 times daily, Disp-10.2 each, R-1Normal  -     montelukast (SINGULAIR) 10 MG tablet; Take 1 tablet by mouth daily, Disp-30 tablet, R-3Normal  3. Snoring  Assessment & Plan:   Chronic  Very high likelihood that patient has sleep apnea and this would explain a lot of his symptoms such as the snoring, fatigue, shortness of breath/exacerbation of asthma, increasing depressive symptoms and lack of energy. Recommend evaluation by sleep medicine    Orders:  -     Nicolette Craig MD, Sleep Medicine, [facility]  4. Primary insomnia  Assessment & Plan:   Chronic not well controlled  I think that his insomnia is multifactorial, likely sleep apnea playing a major role, as well as anxiety and depression, would not recommend starting any medication for sleep such as trazodone or Ambien due to their heavy sedated effects and possible exacerbation of sleep apnea and therefore worsening of his asthma.   Would like him to see sleep medicine first.    No follow-ups on file.       Subjective   HPI     Following up on anxiety and depression I saw him back in November was started on Lexapro. He states that he did well the first couple months but over the last month has not noticed any improvement in is not well controlled    Was going good for awhile and over past 2 weeks hasnt been doing well, not sleeping at al, taking nap during day, 3-4 hours every night. Snores a lot, wakes up multiple times in the night for no reason, fatigue during the day, asthma has been getting worse, not waking up coughing in the middle of the night, states that he is really short of breath especially first thing in the morning, does not have acid reflux but since starting on a new inhaler Symbicort he has noticed some burning sensation in his chest.  He has been using his Symbicort more than 2 puffs twice a day will use it several times a day every day. States that he is allergic to allergies like dust etc.    Review of Systems   All other systems reviewed and are negative. Objective   Patient-Reported Vitals  Patient-Reported Weight: 200lb  Patient-Reported Height: 5'11\"       Physical Exam  Vitals reviewed. Constitutional:       General: He is not in acute distress. Appearance: Normal appearance. He is not ill-appearing. HENT:      Head: Normocephalic and atraumatic. Right Ear: External ear normal.      Left Ear: External ear normal.   Eyes:      General: No scleral icterus. Right eye: No discharge. Left eye: No discharge. Extraocular Movements: Extraocular movements intact. Conjunctiva/sclera: Conjunctivae normal.   Pulmonary:      Effort: Pulmonary effort is normal. No respiratory distress. Musculoskeletal:      Cervical back: Neck supple. Skin:     Coloration: Skin is not jaundiced. Findings: No lesion or rash. Neurological:      Mental Status: He is alert. Cranial Nerves: No cranial nerve deficit.       Coordination: Coordination normal.   Psychiatric:         Mood and Affect: Mood normal.                  Amrit Zaragoza, was evaluated through a synchronous (real-time) audio-video encounter. The patient (or guardian if applicable) is aware that this is a billable service, which includes applicable co-pays. This Virtual Visit was conducted with patient's (and/or legal guardian's) consent. The visit was conducted pursuant to the emergency declaration under the [address] [place] [address] authority and the [employer] General Act. Patient identification was verified, and a caregiver was present when appropriate. The patient was located at home in a state where the provider was licensed to provide care.        --Cherylene Reins, MD

## 2022-03-02 NOTE — PROGRESS NOTES
Within this Telehealth Consent, the terms you and yours refer to the person using the Telehealth Service (Service), or in the case of a use of the Service by or on behalf of a minor, you and yours refer to and include (i) the parent or legal guardian who provides consent to the use of the Service by such minor or uses the Service on behalf of such minor, and (ii) the minor for whom consent is being provided or on whose behalf the Service is being utilized. When using Service, you will be consulting with your health care providers via the use of Telehealth.   Telehealth involves the delivery of healthcare services using electronic communications, information technology or other means between a healthcare provider and a patient who are not in the same physical location. Telehealth may be used for diagnosis, treatment, follow-up and/or patient education, and may include, but is not limited to, one or more of the following:    Electronic transmission of medical records, photo images, personal health information or other data between a patient and a healthcare provider    Interactions between a patient and healthcare provider via audio, video and/or data communications    Use of output data from medical devices, sound and video files    Anticipated Benefits   The use of Telehealth by your Provider(s) through the Service may have the following possible benefits:    Making it easier and more efficient for you to access medical care and treatment for the conditions treated by such Provider(s) utilizing the Service    Allowing you to obtain medical care and treatment by Provider(s) at times that are convenient for you    Enabling you to interact with Provider(s) without the necessity of an in-office appointment     Possible Risks   While the use of Telehealth can provide potential benefits for you, there are also potential risks associated with the use of Telehealth.  These risks include, but may not be limited to the following:    Your Provider(s) may not able to provide medical treatment for your particular condition and you may be required to seek alternative healthcare or emergency care services.  The electronic systems or other security protocols or safeguards used in the Service could fail, causing a breach of privacy of your medical or other information.  Given regulatory requirements in certain jurisdictions, your Provider(s) diagnosis and/or treatment options, especially pertaining to certain prescriptions, may be limited. Acceptance   1. You understand that Services will be provided via Telehealth. This process involves the use of HIPAA compliant and secure, real-time audio-visual interfacing with a qualified and appropriately trained provider located at [facility]. 2. You understand that, under no circumstances, will this session be recorded. 3. You understand that the Provider(s) at [facility] and other clinical participants will be party to the information obtained during the Telehealth session in accordance with best medical practices. 4. You understand that the information obtained during the Telehealth session will be used to help determine the most appropriate treatment options. 5. You understand that You have the right to revoke this consent at any point in time. 6. You understand that Telehealth is voluntary, and that continued treatment is not dependent upon consent. 7. You understand that, in the event of non-consent to Telehealth services and/or technical difficulties, you will obtain services as typically provided in the absence of Telehealth technology. 8. You understand that this consent will be kept in Your medical record. 9. No potential benefits from the use of Telehealth or specific results can be guaranteed. Your condition may not be cured or improved and, in some cases, may get worse.    10. There are limitations in the provision of medical care and treatment via Telehealth and the Service and you may not be able to receive diagnosis and/or treatment through the Service for every condition for which you seek diagnosis and/or treatment. 11. There are potential risks to the use of Telehealth, including but not limited to the risks described in this Telehealth Consent. 12. Your Provider(s) have discussed the use of Telehealth and the Service with you, including the benefits and risks of such and you have provided oral consent to your Provider(s) for the use of Telehealth and the Service. 15. You understand that it is your duty to provide your Provider(s) truthful, accurate and complete information, including all relevant information regarding care that you may have received or may be receiving from other healthcare providers outside of the Service. 14. You understand that each of your Provider(s) may determine in his or sole discretion that your condition is not suitable for diagnosis and/or treatment using the Service, and that you may need to seek medical care and treatment a specialist or other healthcare provider, outside of the Service. 15. You understand that you are fully responsible for payment for all services provided by Provider(s) or through use of the Service and that you may not be able to use third-party insurance. 16. You represent that (a) you have read this Telehealth Consent carefully, (b) you understand the risks and benefits of the Service and the use of Telehealth in the medical care and treatment provided to you by Provider(s) using the Service, and (c) you have the legal capacity and authority to provide this consent for yourself and/or the minor for which you are consenting under applicable federal and state laws, including laws relating to the age of [de-identified] and/or parental/guardian consent.    17. You give your informed consent to the use of Telehealth by Provider(s) using the Service under the terms described in the Terms of Service and this Telehealth Consent. The patient was read the following statement and has consented to the visit as of 3/2/22.      The patient has been scheduled for their first telehealth visit on 3/2/2022 with Umesh Blank MD.

## 2022-03-02 NOTE — ASSESSMENT & PLAN NOTE
Chronic not well controlled  He is taking his Symbicort and using as a rescue inhaler which is fine but would not recommend going over more than 12 puffs in a day  Continue with Symbicort, will add on Singulair daily

## 2022-05-02 DIAGNOSIS — F32.A ANXIETY AND DEPRESSION: ICD-10-CM

## 2022-05-02 DIAGNOSIS — F41.9 ANXIETY AND DEPRESSION: ICD-10-CM

## 2022-05-02 RX ORDER — VENLAFAXINE HYDROCHLORIDE 75 MG/1
CAPSULE, EXTENDED RELEASE ORAL
Qty: 90 CAPSULE | Refills: 3 | Status: SHIPPED | OUTPATIENT
Start: 2022-05-02

## 2022-05-02 NOTE — TELEPHONE ENCOUNTER
Medication:   Requested Prescriptions     Pending Prescriptions Disp Refills    venlafaxine (EFFEXOR XR) 75 MG extended release capsule [Pharmacy Med Name: VENLAFAXINE HCL ER 75 MG CAP] 30 capsule 1     Sig: TAKE 1 CAPSULE BY MOUTH EVERY DAY     Last Filled: 3.2.22    Last appt: 3/2/2022   Next appt: Visit date not found    Last OARRS: No flowsheet data found.